# Patient Record
Sex: MALE | Race: WHITE | Employment: FULL TIME | URBAN - METROPOLITAN AREA
[De-identification: names, ages, dates, MRNs, and addresses within clinical notes are randomized per-mention and may not be internally consistent; named-entity substitution may affect disease eponyms.]

---

## 2017-10-09 ENCOUNTER — ALLIED HEALTH/NURSE VISIT (OUTPATIENT)
Dept: NURSING | Facility: CLINIC | Age: 42
End: 2017-10-09
Payer: COMMERCIAL

## 2017-10-09 DIAGNOSIS — Z23 NEED FOR PROPHYLACTIC VACCINATION AND INOCULATION AGAINST INFLUENZA: Primary | ICD-10-CM

## 2017-10-09 PROCEDURE — 90471 IMMUNIZATION ADMIN: CPT

## 2017-10-09 PROCEDURE — 90686 IIV4 VACC NO PRSV 0.5 ML IM: CPT

## 2017-10-09 PROCEDURE — 99207 ZZC NO CHARGE NURSE ONLY: CPT

## 2017-10-09 NOTE — MR AVS SNAPSHOT
After Visit Summary   10/9/2017    Daniel Cotter    MRN: [id number]           Patient Information     Date Of Birth          1975        Visit Information        Provider Department      10/9/2017 9:00 AM [facility]        Today's Diagnoses     Need for prophylactic vaccination and inoculation against influenza    -  1       Follow-ups after your visit        Who to contact     If you have questions or need follow up information about today's clinic visit or your schedule please contact [facility] directly at [phone].  Normal or non-critical lab and imaging results will be communicated to you by Lutonixhart, letter or phone within 4 business days after the clinic has received the results. If you do not hear from us within 7 days, please contact the clinic through AltspaceVRt or phone. If you have a critical or abnormal lab result, we will notify you by phone as soon as possible.  Submit refill requests through [employer] or call your pharmacy and they will forward the refill request to us. Please allow 3 business days for your refill to be completed.          Additional Information About Your Visit        MyChart Information     [employer] gives you secure access to your electronic health record. If you see a primary care provider, you can also send messages to your care team and make appointments. If you have questions, please call your primary care clinic.  If you do not have a primary care provider, please call [phone] and they will assist you.        Care EveryWhere ID     This is your Care EveryWhere ID. This could be used by other organizations to access your [place] medical records  [id number]         Blood Pressure from Last 3 Encounters:   09/01/16 135/89   06/24/16 (!) 155/93   06/09/16 (!) 146/91    Weight from Last 3 Encounters:   09/01/16 (!) 314 lb (142.4 kg)   06/24/16 (!) 315 lb (142.9 kg)   06/09/16 (!) 317 lb (143.8 kg)              We  Performed the Following     FLU VAC, SPLIT VIRUS IM > 3 YO (QUADRIVALENT) [28715]     Vaccine Administration, Initial [50636]        Primary Care Provider Office Phone # Fax #    Madhu Heredia PA-C [phone] [address] [facility] [address]        Equal Access to Services     [facility] : Hadii aad ku hadasho Soomaali, waaxda luqadaha, qaybta kaalmada adeegyada, waxay idiin hayaan adeeg kharash la'aan . So [facility] [phone].    ATENCIÓN: Si habla español, tiene a haynes disposición servicios gratuitos de asistencia lingüística. Llame al [phone].    We comply with applicable federal civil rights laws and [place] laws. We do not discriminate on the basis of race, color, national origin, age, disability, sex, sexual orientation, or gender identity.            Thank you!     Thank you for choosing [facility]  for your care. Our goal is always to provide you with excellent care. Hearing back from our patients is one way we can continue to improve our services. Please take a few minutes to complete the written survey that you may receive in the mail after your visit with us. Thank you!             Your Updated Medication List - Protect others around you: Learn how to safely use, store and throw away your medicines at www.disposemymeds.org.          This list is accurate as of: 10/9/17  9:56 AM.  Always use your most recent med list.                   Brand Name Dispense Instructions for use Diagnosis    order for DME      Resmed Airsense 10 auto cpap 5-18 cm, Simplus med FFM

## 2017-10-09 NOTE — PROGRESS NOTES
Injectable Influenza Immunization Documentation    1.  Is the person to be vaccinated sick today?   No    2. Does the person to be vaccinated have an allergy to a component   of the vaccine?   No    3. Has the person to be vaccinated ever had a serious reaction   to influenza vaccine in the past?   No    4. Has the person to be vaccinated ever had Guillain-Barré syndrome?   No    Form completed by Lady Alcantar MA October 9, 20179:55 AM

## 2019-12-14 ENCOUNTER — OFFICE VISIT (OUTPATIENT)
Dept: URGENT CARE | Facility: URGENT CARE | Age: 44
End: 2019-12-14
Payer: COMMERCIAL

## 2019-12-14 VITALS
DIASTOLIC BLOOD PRESSURE: 99 MMHG | BODY MASS INDEX: 44.01 KG/M2 | WEIGHT: 297.13 LBS | OXYGEN SATURATION: 97 % | HEART RATE: 97 BPM | HEIGHT: 69 IN | TEMPERATURE: 97.4 F | SYSTOLIC BLOOD PRESSURE: 157 MMHG

## 2019-12-14 DIAGNOSIS — R07.0 THROAT PAIN: Primary | ICD-10-CM

## 2019-12-14 LAB
DEPRECATED S PYO AG THROAT QL EIA: NORMAL
SPECIMEN SOURCE: NORMAL

## 2019-12-14 PROCEDURE — 87081 CULTURE SCREEN ONLY: CPT | Performed by: PREVENTIVE MEDICINE

## 2019-12-14 PROCEDURE — 99203 OFFICE O/P NEW LOW 30 MIN: CPT | Performed by: PREVENTIVE MEDICINE

## 2019-12-14 PROCEDURE — 87880 STREP A ASSAY W/OPTIC: CPT | Performed by: PREVENTIVE MEDICINE

## 2019-12-14 ASSESSMENT — MIFFLIN-ST. JEOR: SCORE: 2238.13

## 2019-12-14 NOTE — PATIENT INSTRUCTIONS
Viral pharyngitis    PLAN:   See orders in epic.   Symptomatic treat with gargles, lozenges, and OTC analgesic as needed. Follow-up with primary clinic if not improving.

## 2019-12-14 NOTE — PROGRESS NOTES
"SUBJECTIVE:  Daniel Cotter is a 44 year old male with a chief complaint of sore throat.  Onset of symptoms was 2 day(s) ago.    Course of illness: gradual onset.  Severity mild  Current and Associated symptoms: sore throat  Treatment measures tried include None tried.  Predisposing factors include None.    No past medical history on file.  Current Outpatient Medications   Medication Sig Dispense Refill     order for DME Resmed Airsense 10 auto cpap 5-18 cm, Simplus med FFM       Social History     Tobacco Use     Smoking status: Former Smoker     Packs/day: 0.50     Years: 6.00     Pack years: 3.00     Types: Cigarettes     Last attempt to quit: 2002     Years since quittin.5   Substance Use Topics     Alcohol use: Yes     Alcohol/week: 4.0 - 5.0 standard drinks     Types: 4 - 5 Standard drinks or equivalent per week       ROS:  CONSTITUTIONAL:NEGATIVE for fever, chills, change in weight  INTEGUMENTARY/SKIN: NEGATIVE for worrisome rashes, moles or lesions  EYES: NEGATIVE for vision changes or irritation  RESP:NEGATIVE for significant cough or SOB  CV: NEGATIVE for chest pain, palpitations or peripheral edema  GI: NEGATIVE for nausea, abdominal pain, heartburn, or change in bowel habits  MUSCULOSKELETAL: NEGATIVE for significant arthralgias or myalgia  NEURO: NEGATIVE for weakness, dizziness or paresthesias  ENDOCRINE: NEGATIVE for temperature intolerance, skin/hair changes    OBJECTIVE:   BP (!) 157/99   Pulse 97   Temp 97.4  F (36.3  C) (Tympanic)   Ht 1.753 m (5' 9\")   Wt 134.8 kg (297 lb 2 oz)   SpO2 97%   BMI 43.88 kg/m    GENERAL APPEARANCE: healthy, alert and no distress  EYES: EOMI,  PERRL, conjunctiva clear  HENT: ear canals and TM's normal.  Nose normal.  Pharynx erythematous with some exudate noted.  NECK: supple, non-tender to palpation, no adenopathy noted  RESP: lungs clear to auscultation - no rales, rhonchi or wheezes  CV: regular rates and rhythm, normal S1 S2, no murmur noted  ABDOMEN: "  soft, nontender, no HSM or masses and bowel sounds normal  SKIN: no suspicious lesions or rashes    Rapid Strep test is negative; await throat culture results.    ASSESSMENT:   Throat pain  Viral pharyngitis    PLAN:   See orders in epic.   Symptomatic treat with gargles, lozenges, and OTC analgesic as needed. Follow-up with primary clinic if not improving.

## 2019-12-15 ENCOUNTER — HEALTH MAINTENANCE LETTER (OUTPATIENT)
Age: 44
End: 2019-12-15

## 2019-12-15 LAB
BACTERIA SPEC CULT: NORMAL
SPECIMEN SOURCE: NORMAL

## 2021-01-15 ENCOUNTER — HEALTH MAINTENANCE LETTER (OUTPATIENT)
Age: 46
End: 2021-01-15

## 2021-05-04 ENCOUNTER — TELEPHONE (OUTPATIENT)
Dept: SLEEP MEDICINE | Facility: CLINIC | Age: 46
End: 2021-05-04

## 2021-05-04 NOTE — TELEPHONE ENCOUNTER
Reason for call:  Other   Patient called regarding (reason for call): call back  Additional comments: Patient is calling because his appointment was missed. Patient is in need of a new CPAP prescription and needs to have this appointment as soon as possible. Patient is wondering If the prescription can be renewed before his next appointment on 6/18. Please call back.     Phone number to reach patient:  Home number on file [phone] (home)    Best Time:  Any time    Can we leave a detailed message on this number?  YES    Travel screening: Not Applicable

## 2021-05-27 NOTE — TELEPHONE ENCOUNTER
Returning patients call, message left that orders cannot be placed until patient re-establishes care with the sleep center.     Lisa Ramirez MA

## 2021-06-18 ENCOUNTER — VIRTUAL VISIT (OUTPATIENT)
Dept: SLEEP MEDICINE | Facility: CLINIC | Age: 46
End: 2021-06-18
Payer: COMMERCIAL

## 2021-06-18 VITALS — BODY MASS INDEX: 45.91 KG/M2 | HEIGHT: 69 IN | WEIGHT: 310 LBS

## 2021-06-18 DIAGNOSIS — G47.33 OSA (OBSTRUCTIVE SLEEP APNEA): Primary | ICD-10-CM

## 2021-06-18 PROCEDURE — 99203 OFFICE O/P NEW LOW 30 MIN: CPT | Mod: 95 | Performed by: PHYSICIAN ASSISTANT

## 2021-06-18 ASSESSMENT — SLEEP AND FATIGUE QUESTIONNAIRES
HOW LIKELY ARE YOU TO NOD OFF OR FALL ASLEEP WHILE SITTING INACTIVE IN A PUBLIC PLACE: WOULD NEVER DOZE
HOW LIKELY ARE YOU TO NOD OFF OR FALL ASLEEP WHILE SITTING QUIETLY AFTER LUNCH WITHOUT ALCOHOL: WOULD NEVER DOZE
HOW LIKELY ARE YOU TO NOD OFF OR FALL ASLEEP WHILE WATCHING TV: WOULD NEVER DOZE
HOW LIKELY ARE YOU TO NOD OFF OR FALL ASLEEP WHILE SITTING AND READING: WOULD NEVER DOZE
HOW LIKELY ARE YOU TO NOD OFF OR FALL ASLEEP WHEN YOU ARE A PASSENGER IN A CAR FOR AN HOUR WITHOUT A BREAK: SLIGHT CHANCE OF DOZING
HOW LIKELY ARE YOU TO NOD OFF OR FALL ASLEEP WHILE SITTING AND TALKING TO SOMEONE: WOULD NEVER DOZE
HOW LIKELY ARE YOU TO NOD OFF OR FALL ASLEEP WHILE LYING DOWN TO REST IN THE AFTERNOON WHEN CIRCUMSTANCES PERMIT: SLIGHT CHANCE OF DOZING
HOW LIKELY ARE YOU TO NOD OFF OR FALL ASLEEP IN A CAR, WHILE STOPPED FOR A FEW MINUTES IN TRAFFIC: WOULD NEVER DOZE

## 2021-06-18 ASSESSMENT — MIFFLIN-ST. JEOR: SCORE: 2291.53

## 2021-06-18 NOTE — PROGRESS NOTES
Juancarlos is a 45 year old who is being evaluated via a billable video visit.      How would you like to obtain your AVS? MyChart  If the video visit is dropped, the invitation should be resent by: Text to cell phone: [phone]  Will anyone else be joining your video visit? No    Video Start Time: 9:57 AM  Video-Visit Details    Type of service:  Video Visit    Video End Time:10:19 AM    Originating Location (pt. Location): Home    Distant Location (provider location):  [facility]     Platform used for Video Visit: Well       Sleep Consultation:    Date on this visit: 6/18/2021    Daniel Cotter is sent by No ref. provider found for a sleep consultation.    Primary Physician: Madhu Heredia     Chief Complaint   Patient presents with     Sleep Apnea     Re-establish care     Daniel Cotter was diagnosed with severe sleep apnea in 2016, managed with CPAP. Last Sleep Medicine follow up was in 2016.    He wants to obtain a new CPAP machine. His current CPAP is close to 5 years old and worn. It makes loud whistling sound that disrupts his and wife's sleep.     He initially presented with loud socially disruptive snoring, witnessed apneas, Excessive daytime sleepiness (ESS 13), nocturnal awakenings, occasional morning headaches, obesity, large neck, narrowed oropharynx, elevated blood pressues.  [facility] [place]  7       Home sleep test 6/23/2016: 317 lbs 0 oz: AHI  56.5.  Supine AHI 83.8. Oxygen Manuel of 64% Baseline 94.3%.   Sp02 <89% for 204 minutes    Oximetry results were obtained for the date of 9/14/2016.  The patient was on a treatment of CPAP 5-18 cm.  The study showed a valid recording time of 7:41 with a 4% desaturation index of 3.5.  The basal oxygen saturation was 94.6% and the lowest SpO2 was 83%.  The patient spent 0.8 minutes below 88% SpO2. No significant hypoxemia on CPAP.    Overall, the patient rates his experience with PAP as 9 (0 poor, 10 great). The mask is comfortable.  The mask is not leaking.  He is snoring (rare) with the mask on. He is not having gasp arousals. He is not having any oral or nasal dryness. The pressure settings are comfortable    His PAP interface is Full Face Mask.    Bedtime is typically 9 PM. Usually it takes about 10 minutes to fall asleep with the mask on. Wake time is typically 5:30 AM.  Patient is using PAP therapy 8.5 hours per night. The patient is usually getting 8.5 hours of sleep per night.    He does feel rested in the morning.    Total score - [place]: 2 (6/18/2021  8:33 AM)      JENNIFER Total Score: 5    ResMed   Auto-PAP 5.0 - 18.0 cmH2O 30 day usage data:    100% of days with > 4 hours of use. 0/30 days with no use.   Average use 478 minutes per day.   95%ile Leak 7.63 L/min.   CPAP 95% pressure 16.2 cm.   AHI 2.73 events per hour.     Patient does not use electronics in bed and watch TV in bed.     Daniel does not do shift work.  He works day shifts.      Patient sleeps on his side. He denies no morning headaches and restless legs. Daniel denies any bruxism, sleep walking, sleep talking, dream enactment, sleep paralysis, cataplexy and hypnogogic/hypnopompic hallucinations.    Daniel denies difficulty breathing through his nose.      Daniel has gained 15 pounds in 5 years.      Daniel naps 0 times per week. He takes no inadvertant naps.  He denies falling asleep while driving.  Patient was counseled on the importance of driving while alert, to pull over if drowsy, or nap before getting into the vehicle if sleepy.  He uses 2 cups/day of coffee. Last caffeine intake is usually before noon.    Allergies:    No Known Allergies    Medications:    Current Outpatient Medications   Medication Sig Dispense Refill     order for DME Resmed Airsense 10 auto cpap 5-18 cm, Simplus med FFM         Problem List:  Patient Active Problem List    Diagnosis Date Noted     MALCOM (obstructive sleep apnea)- severe (AHI 56) 06/27/2016     Priority: Medium     Home Sleep Test  16 (317#)- AHI 56, low o2 64%       Impaired fasting glucose 2016     Priority: Medium     Obesity, morbid, BMI 40.0-49.9 (H) 2016     Priority: Medium     Elevated blood pressure reading without diagnosis of hypertension 2016     Priority: Medium        Past Medical/Surgical History:  No past medical history on file.  Past Surgical History:   Procedure Laterality Date     APPENDECTOMY         Social History:  Social History     Socioeconomic History     Marital status:      Spouse name: Not on file     Number of children: Not on file     Years of education: Not on file     Highest education level: Not on file   Occupational History     Occupation:      Comment: [employer]   Social Needs     Financial resource strain: Not on file     Food insecurity     Worry: Not on file     Inability: Not on file     Transportation needs     Medical: Not on file     Non-medical: Not on file   Tobacco Use     Smoking status: Former Smoker     Packs/day: 0.50     Years: 6.00     Pack years: 3.00     Types: Cigarettes     Quit date: 2002     Years since quittin.0   Substance and Sexual Activity     Alcohol use: Yes     Alcohol/week: 4.0 - 5.0 standard drinks     Types: 4 - 5 Standard drinks or equivalent per week     Drug use: No     Sexual activity: Yes     Partners: Female   Lifestyle     Physical activity     Days per week: Not on file     Minutes per session: Not on file     Stress: Not on file   Relationships     Social connections     Talks on phone: Not on file     Gets together: Not on file     Attends [employer] service: Not on file     Active member of club or organization: Not on file     Attends meetings of clubs or organizations: Not on file     Relationship status: Not on file     Intimate partner violence     Fear of current or ex partner: Not on file     Emotionally abused: Not on file     Physically abused: Not on file     Forced sexual activity: Not on file  "  Other Topics Concern     Parent/sibling w/ CABG, MI or angioplasty before 65F 55M? Not Asked   Social History Narrative     Not on file       Family History:  Family History   Problem Relation Age of Onset     Cerebrovascular Disease Maternal Grandfather      Diabetes No family hx of      Coronary Artery Disease No family hx of      Colon Cancer No family hx of        Review of Systems:  A complete review of systems reviewed by me is negative with the exeption of what has been mentioned in the history of present illness.  CONSTITUTIONAL:  POSITIVE for  weight gain  EYES: NEGATIVE for changes in vision, blind spots, double vision.  ENT: NEGATIVE for ear pain, sore throat, sinus pain, post-nasal drip, runny nose, bloody nose  CARDIAC: NEGATIVE for fast heartbeats or fluttering in chest, chest pain or pressure, breathlessness when lying flat, swollen legs or swollen feet.  NEUROLOGIC: NEGATIVE headaches, weakness or numbness in the arms or legs.  DERMATOLOGIC: NEGATIVE for rashes, new moles or change in mole(s)  PULMONARY: NEGATIVE SOB at rest, SOB with activity, dry cough, productive cough, coughing up blood, wheezing or whistling when breathing.    GASTROINTESTINAL: NEGATIVE for nausea or vomitting, loose or watery stools, fat or grease in stools, constipation, abdominal pain, bowel movements black in color or blood noted.  GENITOURINARY: NEGATIVE for pain during urination, blood in urine, urinating more frequently than usual, irregular menstrual periods.  MUSCULOSKELETAL: NEGATIVE for muscle pain, bone or joint pain, swollen joints.  ENDOCRINE: NEGATIVE for increased thirst or urination, diabetes.  LYMPHATIC: NEGATIVE for swollen lymph nodes, lumps or bumps in the breasts or nipple discharge.    Physical Examination:  Vitals: Ht 1.753 m (5' 9\")   Wt 140.6 kg (310 lb)   BMI 45.78 kg/m    BMI= Body mass index is 45.78 kg/m .         [place] Total Score 6/18/2021   Total score - [place] 2       JENNFIER Total Score: 5 " (06/18/21 0832)    GENERAL APPEARANCE: alert and no distress  EYES: Eyes grossly normal to inspection  RESP: breathing is not labored.   NEURO: mentation intact and speech normal    Last Comprehensive Metabolic Panel:  Sodium   Date Value Ref Range Status   05/13/2016 140 133 - 144 mmol/L Final     Potassium   Date Value Ref Range Status   05/13/2016 4.2 3.4 - 5.3 mmol/L Final     Chloride   Date Value Ref Range Status   05/13/2016 105 94 - 109 mmol/L Final     Carbon Dioxide   Date Value Ref Range Status   05/13/2016 29 20 - 32 mmol/L Final     Anion Gap   Date Value Ref Range Status   05/13/2016 6 3 - 14 mmol/L Final     Glucose   Date Value Ref Range Status   05/13/2016 127 (H) 70 - 99 mg/dL Final     Urea Nitrogen   Date Value Ref Range Status   05/13/2016 12 7 - 30 mg/dL Final     Creatinine   Date Value Ref Range Status   05/13/2016 0.83 0.66 - 1.25 mg/dL Final     GFR Estimate   Date Value Ref Range Status   05/13/2016 >90  Non  GFR Calc   >60 mL/min/1.7m2 Final     Calcium   Date Value Ref Range Status   05/13/2016 9.1 8.5 - 10.1 mg/dL Final     TSH   Date Value Ref Range Status   05/13/2016 1.68 0.40 - 4.00 mU/L Final     Impression/Plan:    Severe obstructive sleep apnea-   Excellent CPAP compliance and AHI  is well controlled on CPAP 5-18  cm/H20.  Narrow the pressures to auto-CPAP 9-18 cm/H20  He needs a new CPAP.  Comprehensive DME      He will follow up with me in 2 months if compliance required. If not, follow up in 2 years.     Salvatore Velez PA-C

## 2021-07-08 ENCOUNTER — TELEPHONE (OUTPATIENT)
Dept: SLEEP MEDICINE | Facility: CLINIC | Age: 46
End: 2021-07-08

## 2021-07-28 ENCOUNTER — TELEPHONE (OUTPATIENT)
Dept: SLEEP MEDICINE | Facility: CLINIC | Age: 46
End: 2021-07-28

## 2021-07-28 NOTE — TELEPHONE ENCOUNTER
RETURNED PT CALL AND LET THE PT KNOW HE HAS BEEN ADDED TO THE WAIT LIST AND WE WILL CALL WHEN A MACHINE BECOMES AVAILABLE. LET THE PT KNOW IF HE DOES NOT ANSWER HE HAS 24HRS TO RESPOND TO OUR VOICEMAIL AND THEN WE WILL MOVE ON TO THE NEXT PT AND TO CALL [facility] IF WANTING TO DISCUSS FURTHER [phone].

## 2021-07-28 NOTE — TELEPHONE ENCOUNTER
PT RETURNED CALL AND SCHEDULED AT THE [facility] SHOWROOM 07/30/2021 1PM. CONFIRMED LOCATION, TIME AND DATE WITH PT.

## 2021-07-30 ENCOUNTER — DOCUMENTATION ONLY (OUTPATIENT)
Dept: SLEEP MEDICINE | Facility: CLINIC | Age: 46
End: 2021-07-30

## 2021-07-30 NOTE — PROGRESS NOTES
Patient was offered choice of vendor and chose Pending sale to [facility].  Patient Daniel Cotter was set up at [place]  on July 30, 2021. Patient received a Resmed AirSense 10 Auto. Pressures were set at 9-15 cm H2O.   Patient s ramp is 5 cm H2O for Auto and FLEX/EPR is 3.  Patient received a [employer] Mask name: Vitera  Full Face mask size Medium, heated tubing and heated humidifier.  Patient does not need to meet compliance.   Kerline Unger   
none

## 2021-08-02 ENCOUNTER — DOCUMENTATION ONLY (OUTPATIENT)
Dept: SLEEP MEDICINE | Facility: CLINIC | Age: 46
End: 2021-08-02

## 2021-08-02 NOTE — PROGRESS NOTES
3 day Sleep therapy management telephone visit    Diagnostic AHI: 56    PSG    Confirmed with patient at time of call- N/A Patient is still interested in STM service       Data only recheck        Objective data     Order Settings for PAP  CPAP min 9    CPAP max 15        Device settings from machine CPAP min 5.0     CPAP max 15       EPR Setting THREE     Assessment: Nightly usage over four hours      Action plan: Patient removed from STM, STM not required or ordered.    Replacement device: Yes  STM ordered by provider: Yes     Total time spent on accessing and  interpreting remote patient PAP therapy data  10 minutes    Total time spent counseling, coaching  and reviewing PAP therapy data with patient  0 minutes    [id number] no

## 2021-09-04 ENCOUNTER — HEALTH MAINTENANCE LETTER (OUTPATIENT)
Age: 46
End: 2021-09-04

## 2022-02-19 ENCOUNTER — HEALTH MAINTENANCE LETTER (OUTPATIENT)
Age: 47
End: 2022-02-19

## 2022-10-16 ENCOUNTER — HEALTH MAINTENANCE LETTER (OUTPATIENT)
Age: 47
End: 2022-10-16

## 2023-03-26 ENCOUNTER — HEALTH MAINTENANCE LETTER (OUTPATIENT)
Age: 48
End: 2023-03-26

## 2024-06-01 ENCOUNTER — HEALTH MAINTENANCE LETTER (OUTPATIENT)
Age: 49
End: 2024-06-01